# Patient Record
Sex: FEMALE | Race: WHITE | NOT HISPANIC OR LATINO | Employment: OTHER | URBAN - NONMETROPOLITAN AREA
[De-identification: names, ages, dates, MRNs, and addresses within clinical notes are randomized per-mention and may not be internally consistent; named-entity substitution may affect disease eponyms.]

---

## 2017-02-02 ENCOUNTER — OFFICE VISIT (OUTPATIENT)
Dept: OBSTETRICS AND GYNECOLOGY | Facility: CLINIC | Age: 67
End: 2017-02-02

## 2017-02-02 VITALS
DIASTOLIC BLOOD PRESSURE: 74 MMHG | HEIGHT: 68 IN | BODY MASS INDEX: 19.7 KG/M2 | WEIGHT: 130 LBS | SYSTOLIC BLOOD PRESSURE: 110 MMHG

## 2017-02-02 DIAGNOSIS — Z01.419 WELL WOMAN EXAM WITH ROUTINE GYNECOLOGICAL EXAM: Primary | ICD-10-CM

## 2017-02-02 DIAGNOSIS — M81.0 OSTEOPOROSIS: ICD-10-CM

## 2017-02-02 DIAGNOSIS — E78.49 OTHER HYPERLIPIDEMIA: ICD-10-CM

## 2017-02-02 PROCEDURE — 99212 OFFICE O/P EST SF 10 MIN: CPT | Performed by: NURSE PRACTITIONER

## 2017-02-02 PROCEDURE — G0101 CA SCREEN;PELVIC/BREAST EXAM: HCPCS | Performed by: NURSE PRACTITIONER

## 2017-02-02 RX ORDER — BENAZEPRIL HYDROCHLORIDE 10 MG/1
TABLET ORAL
COMMUNITY
Start: 2017-01-31

## 2017-02-02 RX ORDER — AMLODIPINE BESYLATE 2.5 MG/1
TABLET ORAL
COMMUNITY
Start: 2017-01-31

## 2017-02-02 RX ORDER — ALENDRONATE SODIUM 70 MG/1
TABLET ORAL
COMMUNITY
Start: 2017-01-31

## 2017-02-02 RX ORDER — ROSUVASTATIN CALCIUM 5 MG/1
5 TABLET, COATED ORAL DAILY
COMMUNITY
End: 2021-03-11

## 2017-02-02 NOTE — PROGRESS NOTES
"        Chief Complaint   Patient presents with   • Gynecologic Exam     Pt is here today for yearly visit with no c/o            HPI -  Patient is in for her yearly exam.  Patient took HRT   many years years but does not take them any longer.   Patient has no bladder problems.  Lorrie BENITES had breast cancer in her 30's.  There is no colon cancer or ovarian cancer in her family. Patient does not exercise much.   PCP is Dr. Garcia. She  Is treated for bone loss with Fosomax by Dr. Garcia.  Ngoc had a hysterectomy.      The following portions of the patient's history were reviewed and updated as appropriate:vital signs, allergies, current medications, past family history, past medical history, past social history, past surgical history and problem list.    Review of Systems   Constitutional: Negative for activity change, chills and fever.   HENT: Negative for congestion, ear discharge, mouth sores and rhinorrhea.    Eyes: Negative for pain, discharge and redness.   Respiratory: Negative for apnea, choking and shortness of breath.    Cardiovascular: Negative for chest pain and leg swelling.   Gastrointestinal: Negative for abdominal distention, anal bleeding and constipation.   Endocrine: Negative for cold intolerance and polydipsia.   Genitourinary: Negative for difficulty urinating, dysuria, genital sores, urgency and vaginal discharge.   Musculoskeletal: Negative for arthralgias, gait problem and myalgias.   Allergic/Immunologic: Negative for environmental allergies.   Neurological: Negative for dizziness, seizures, syncope and headaches.   Hematological: Negative for adenopathy.   Psychiatric/Behavioral: Negative for agitation, decreased concentration and hallucinations.       Objective        Visit Vitals   • /74 (BP Location: Right arm, Patient Position: Sitting, Cuff Size: Adult)   • Ht 68\" (172.7 cm)   • Wt 130 lb (59 kg)   • Breastfeeding No   • BMI 19.77 kg/m2         Physical Exam   Constitutional: She is " oriented to person, place, and time. She appears well-developed and well-nourished. No distress.   HENT:   Head: Normocephalic and atraumatic.   Eyes: Conjunctivae and EOM are normal. Right eye exhibits no discharge. Left eye exhibits no discharge. No scleral icterus.   Neck: Normal range of motion. Neck supple. No thyromegaly present.   Cardiovascular: Normal rate and regular rhythm.  Exam reveals no friction rub.    No murmur heard.  Pulmonary/Chest: Effort normal and breath sounds normal. No respiratory distress. She has no wheezes. Right breast exhibits no inverted nipple, no mass, no nipple discharge and no skin change. Left breast exhibits no inverted nipple, no mass, no nipple discharge and no skin change.   Abdominal: Soft. She exhibits no distension and no mass. There is no tenderness. There is no guarding. Hernia confirmed negative in the right inguinal area and confirmed negative in the left inguinal area.   Genitourinary: Vagina normal. Rectal exam shows no mass. Pelvic exam was performed with patient supine. There is no rash, tenderness or lesion on the right labia. There is no rash, tenderness or lesion on the left labia. Uterus is not enlarged. Right adnexum displays no mass and no tenderness. Left adnexum displays no mass and no tenderness. No erythema in the vagina. No foreign body in the vagina. No signs of injury around the vagina. No vaginal discharge found.   Genitourinary Comments: B/S/U  Without lesions  Urethra  Normal  Perineum  Normal  Urethral meatus  Normal  Vagina  Normal relaxed anterior wall  Rectum  No masses  Bladder  nontender   Musculoskeletal: She exhibits deformity. She exhibits no edema.   Neurological: She is alert and oriented to person, place, and time. Coordination normal.   Skin: Skin is warm and dry. She is not diaphoretic. No erythema. No pallor.   Psychiatric: She has a normal mood and affect. Her behavior is normal. Thought content normal. Her mood appears not anxious.  Her affect is not blunt, not labile and not inappropriate. She does not exhibit a depressed mood.   Nursing note and vitals reviewed.               Ngoc was seen today for gynecologic exam.    Diagnoses and all orders for this visit:    Well woman exam with routine gynecological exam    Osteoporosis  -     Comprehensive Metabolic Panel  -     Lipid Panel With LDL / HDL Ratio  -     T3, Uptake  -     T4, Free  -     TSH  -     Vitamin D 25 Hydroxy    Other hyperlipidemia  -     Lipid Panel With LDL / HDL Ratio  -     T3, Uptake  -     T4, Free  -     TSH      Mammogram is up to date.  BMD's ordered by Dr. Garcia who prescribes her bone loss.  Patient is counseled re: BSE, diet, exercise, mammogram, calcium and Vit. D3              Ioana Salazar, APRN  2/2/2017

## 2017-02-03 LAB
25(OH)D3+25(OH)D2 SERPL-MCNC: 29.8 NG/ML (ref 30–100)
ALBUMIN SERPL-MCNC: 4.7 G/DL (ref 3.5–5)
ALBUMIN/GLOB SERPL: 1.6 G/DL (ref 1.1–2.5)
ALP SERPL-CCNC: 70 U/L (ref 24–120)
ALT SERPL-CCNC: 64 U/L (ref 0–54)
AST SERPL-CCNC: 33 U/L (ref 7–45)
BILIRUB SERPL-MCNC: 0.7 MG/DL (ref 0.1–1)
BUN SERPL-MCNC: 19 MG/DL (ref 5–21)
BUN/CREAT SERPL: 23.8 (ref 7–25)
CALCIUM SERPL-MCNC: 9.5 MG/DL (ref 8.4–10.4)
CHLORIDE SERPL-SCNC: 101 MMOL/L (ref 98–110)
CHOLEST SERPL-MCNC: 160 MG/DL (ref 130–200)
CO2 SERPL-SCNC: 26 MMOL/L (ref 24–31)
CREAT SERPL-MCNC: 0.8 MG/DL (ref 0.5–1.4)
GLOBULIN SER CALC-MCNC: 2.9 GM/DL
GLUCOSE SERPL-MCNC: 101 MG/DL (ref 70–100)
HDLC SERPL-MCNC: 86 MG/DL
LDLC SERPL CALC-MCNC: 61 MG/DL (ref 0–99)
LDLC/HDLC SERPL: 0.71 {RATIO}
POTASSIUM SERPL-SCNC: 4.3 MMOL/L (ref 3.5–5.3)
PROT SERPL-MCNC: 7.6 G/DL (ref 6.3–8.7)
SODIUM SERPL-SCNC: 140 MMOL/L (ref 135–145)
T3RU NFR SERPL: 27 % (ref 24–39)
T4 FREE SERPL-MCNC: 1.23 NG/DL (ref 0.78–2.19)
TRIGL SERPL-MCNC: 66 MG/DL (ref 0–149)
TSH SERPL DL<=0.005 MIU/L-ACNC: 0.56 MIU/ML (ref 0.47–4.68)
VLDLC SERPL CALC-MCNC: 13.2 MG/DL

## 2017-02-10 DIAGNOSIS — R74.8 ELEVATED LIVER ENZYMES: Primary | ICD-10-CM

## 2017-03-15 ENCOUNTER — RESULTS ENCOUNTER (OUTPATIENT)
Dept: OBSTETRICS AND GYNECOLOGY | Facility: CLINIC | Age: 67
End: 2017-03-15

## 2017-03-15 DIAGNOSIS — R74.8 ELEVATED LIVER ENZYMES: ICD-10-CM

## 2021-01-28 ENCOUNTER — IMMUNIZATION (OUTPATIENT)
Age: 71
End: 2021-01-28
Payer: MEDICARE

## 2021-01-28 PROCEDURE — 0001A PR IMM ADMN SARSCOV2 30MCG/0.3ML DIL RECON 1ST DOSE: CPT | Performed by: FAMILY MEDICINE

## 2021-01-28 PROCEDURE — 91300 COVID-19, PFIZER VACCINE 30MCG/0.3ML DOSE: CPT | Performed by: FAMILY MEDICINE

## 2021-03-11 ENCOUNTER — OFFICE VISIT (OUTPATIENT)
Dept: GASTROENTEROLOGY | Facility: CLINIC | Age: 71
End: 2021-03-11

## 2021-03-11 VITALS
BODY MASS INDEX: 20.31 KG/M2 | TEMPERATURE: 97.1 F | SYSTOLIC BLOOD PRESSURE: 120 MMHG | HEART RATE: 84 BPM | WEIGHT: 134 LBS | OXYGEN SATURATION: 99 % | DIASTOLIC BLOOD PRESSURE: 80 MMHG | HEIGHT: 68 IN

## 2021-03-11 DIAGNOSIS — I10 HTN (HYPERTENSION), BENIGN: ICD-10-CM

## 2021-03-11 DIAGNOSIS — Z12.11 ENCOUNTER FOR SCREENING FOR MALIGNANT NEOPLASM OF COLON: Primary | ICD-10-CM

## 2021-03-11 DIAGNOSIS — R10.13 DYSPEPSIA: ICD-10-CM

## 2021-03-11 PROCEDURE — 99203 OFFICE O/P NEW LOW 30 MIN: CPT | Performed by: CLINICAL NURSE SPECIALIST

## 2021-03-11 RX ORDER — SODIUM, POTASSIUM,MAG SULFATES 17.5-3.13G
SOLUTION, RECONSTITUTED, ORAL ORAL
Qty: 177 ML | Refills: 0 | Status: ON HOLD | OUTPATIENT
Start: 2021-03-11 | End: 2021-04-19

## 2021-03-11 NOTE — PROGRESS NOTES
Ngoc Phillip  1950      3/11/2021  Chief Complaint   Patient presents with   • GI Problem     Discuss Hiatal Hernia and schedule a colonoscopy     Subjective   HPI  Ngoc Phillip is a 70 y.o. female who presents as a referral for preventative maintenance. She has no complaints of nausea or vomiting. No change in bowels. No wt loss. No BRBPR. No melena. There is NO family hx for colon cancer. No abdominal pain.  She has a hx of reflux and indigestion and at one time took PPI therapy in the past but stopped for unknown reason. She does have some mild to moderate reflux symptoms with gas and belching. She says it is mild to moderate for her. No certain triggers. No dysphagia. No wt loss.   Past Medical History:   Diagnosis Date   • Hyperlipidemia    • Hypertension    • Osteoporosis      Past Surgical History:   Procedure Laterality Date   • APPENDECTOMY     • CHOLECYSTECTOMY     • COLONOSCOPY  03/10/2016    Tics otherwise normal exam repeat in 5 years   • DILATATION AND CURETTAGE     • ENDOSCOPY  03/22/2016    HH otherwise normal exam   • HERNIA REPAIR     • SUBTOTAL HYSTERECTOMY       Outpatient Medications Marked as Taking for the 3/11/21 encounter (Office Visit) with Sara Calabrese APRN   Medication Sig Dispense Refill   • alendronate (FOSAMAX) 70 MG tablet      • amLODIPine (NORVASC) 2.5 MG tablet      • benazepril (LOTENSIN) 10 MG tablet      • Calcium Carb-Cholecalciferol (CALTRATE 600+D) 600-800 MG-UNIT tablet Take 1 tablet by mouth 2 (Two) Times a Day With Meals.     • Multiple Vitamins-Minerals (EYE VITAMINS PO) Take  by mouth.     • Multiple Vitamins-Minerals (MULTIVITAMIN ADULTS 50+ PO) Take  by mouth.       Allergies   Allergen Reactions   • Codeine Nausea And Vomiting     Social History     Socioeconomic History   • Marital status:      Spouse name: Not on file   • Number of children: Not on file   • Years of education: Not on file   • Highest education level: Not on file   Tobacco  "Use   • Smoking status: Never Smoker   • Smokeless tobacco: Never Used   Substance and Sexual Activity   • Alcohol use: No   • Drug use: No   • Sexual activity: Yes     Partners: Male     Birth control/protection: Surgical     Family History   Problem Relation Age of Onset   • Myasthenia gravis Mother    • Esophageal cancer Father    • Heart disease Brother    • Breast cancer Maternal Grandmother    • No Known Problems Maternal Grandfather    • No Known Problems Paternal Grandmother    • No Known Problems Paternal Grandfather    • No Known Problems Brother    • Breast cancer Paternal Aunt 70   • Colon cancer Neg Hx    • Colon polyps Neg Hx      Health Maintenance   Topic Date Due   • DXA SCAN  Never done   • TDAP/TD VACCINES (1 - Tdap) Never done   • ZOSTER VACCINE (1 of 2) Never done   • Pneumococcal Vaccine 65+ (1 of 1 - PPSV23) Never done   • HEPATITIS C SCREENING  Never done   • LIPID PANEL  02/02/2018   • ANNUAL WELLNESS VISIT  10/16/2019   • MAMMOGRAM  12/04/2019   • COVID-19 Vaccine (2 of 2 - Pfizer series) 02/18/2021   • COLONOSCOPY  03/10/2026   • INFLUENZA VACCINE  Completed   • MENINGOCOCCAL VACCINE  Aged Out       REVIEW OF SYSTEMS  General: well appearing, no fever chills or sweats, no unexplained wt loss  HEENT: no acute visual or hearing disturbances  Cardiovascular: No chest pain or palpitations  Pulmonary: No shortness of breath, coughing, wheezing or hemoptysis  : No burning, urgency, hematuria, or dysuria  Musculoskeletal: No joint pain or stiffness  Peripheral: no edema  Skin: No lesions or rashes  Neuro: No dizziness, headaches, stroke, syncope  Endocrine: No hot or cold intolerances  Hematological: No blood dyscrasias    Objective   Vitals:    03/11/21 0951   BP: 120/80   Pulse: 84   Temp: 97.1 °F (36.2 °C)   SpO2: 99%   Weight: 60.8 kg (134 lb)   Height: 172.7 cm (68\")     Body mass index is 20.37 kg/m².  Patient's Body mass index is 20.37 kg/m². BMI is within normal parameters. No follow-up " required..      PHYSICAL EXAM  General: age appropriate well nourished well appearing, no acute distress  Head: normocephalic and atraumatic  Global assessment-supple  Neck-No JVD noted, no lymphadenopathy  Pulmonary-clear to auscultation bilaterally, normal respiratory effort  Cardiovascular-normal rate and rhythm, normal heart sounds, S1 and S2 noted  Abdomen-soft, non tender, non distended, normal bowel sounds all 4 quadrants, no hepatosplenomegaly noted  Extremities-No clubbing cyanosis or edema  Neuro-Non focal, converses appropriately, awake, alert, oriented    Assessment/Plan     Diagnoses and all orders for this visit:    1. Encounter for screening for malignant neoplasm of colon (Primary)  -     Case Request; Standing  -     Follow Anesthesia Guidelines / Standing Orders; Future  -     Obtain Informed Consent; Future  -     Implement Anesthesia Orders Day of Procedure; Standing  -     Obtain Informed Consent; Standing  -     Verify Bowel Prep Was Successful; Standing  -     Case Request    2. HTN (hypertension), benign  Comments:  cont BP medication the day of procedure    3. Dyspepsia    Other orders  -     Suprep Bowel Prep Kit 17.5-3.13-1.6 GM/177ML solution oral solution; Take as directed by office instructions provided  Dispense: 177 mL; Refill: 0        ESOPHAGOGASTRODUODENOSCOPY WITH ANESTHESIA (N/A), COLONOSCOPY WITH ANESTHESIA (N/A)  Body mass index is 20.37 kg/m².    Patient instructions on prep prior to procedure provided to the patient.    All risks, benefits, alternatives, and indications of colonoscopy procedure have been discussed with the patient. Risks to include perforation of the colon requiring possible surgery or colostomy, risk of bleeding from biopsies or removal of colon tissue, possibility of missing a colon polyp or cancer, or adverse drug reaction.  Benefits to include the diagnosis and management of disease of the colon and rectum. Alternatives to include barium enema,  radiographic evaluation, lab testing or no intervention. Pt verbalizes understanding and agrees.     Sara Diane Calabrese, CLARKE  3/11/2021  10:04 CST      IF YOU SMOKE OR USE TOBACCO PLEASE READ THE FOLLOWIN minutes reading provided    Why is smoking bad for me?  Smoking increases the risk of heart disease, lung disease, vascular disease, stroke, and cancer.     If you smoke, STOP!    If you would like more information on quitting smoking, please visit the [employer] website: [email or web address]   This link will provide additional resources including the QUIT line and the Beat the Pack support groups.     For more information:    Quit Now [place]  -QUIT-NOW  [email or web address]    Obesity, Adult  Obesity is the condition of having too much total body fat. Being overweight or obese means that your weight is greater than what is considered healthy for your body size. Obesity is determined by a measurement called BMI. BMI is an estimate of body fat and is calculated from height and weight. For adults, a BMI of 30 or higher is considered obese.  Obesity can eventually lead to other health concerns and major illnesses, including:  · Stroke.  · Coronary artery disease (CAD).  · Type 2 diabetes.  · Some types of cancer, including cancers of the colon, breast, uterus, and gallbladder.  · Osteoarthritis.  · High blood pressure (hypertension).  · High cholesterol.  · Sleep apnea.  · Gallbladder stones.  · Infertility problems.  What are the causes?  The main cause of obesity is taking in (consuming) more calories than your body uses for energy. Other factors that contribute to this condition may include:  · Being born with genes that make you more likely to become obese.  · Having a medical condition that causes obesity. These conditions include:  ¨ Hypothyroidism.  ¨ Polycystic ovarian syndrome (PCOS).  ¨ Binge-eating disorder.  ¨ Cushing  syndrome.  · Taking certain medicines, such as steroids, antidepressants, and seizure medicines.  · Not being physically active (sedentary lifestyle).  · Living where there are limited places to exercise safely or buy healthy foods.  · Not getting enough sleep.  What increases the risk?  The following factors may increase your risk of this condition:  · Having a family history of obesity.  · Being a woman of -American descent.  · Being a man of  descent.  What are the signs or symptoms?  Having excessive body fat is the main symptom of this condition.  How is this diagnosed?  This condition may be diagnosed based on:  · Your symptoms.  · Your medical history.  · A physical exam. Your health care provider may measure:  ¨ Your BMI. If you are an adult with a BMI between 25 and less than 30, you are considered overweight. If you are an adult with a BMI of 30 or higher, you are considered obese.  ¨ The distances around your hips and your waist (circumferences). These may be compared to each other to help diagnose your condition.  ¨ Your skinfold thickness. Your health care provider may gently pinch a fold of your skin and measure it.  How is this treated?  Treatment for this condition often includes changing your lifestyle. Treatment may include some or all of the following:  · Dietary changes. Work with your health care provider and a dietitian to set a weight-loss goal that is healthy and reasonable for you. Dietary changes may include eating:  ¨ Smaller portions. A portion size is the amount of a particular food that is healthy for you to eat at one time. This varies from person to person.  ¨ Low-calorie or low-fat options.  ¨ More whole grains, fruits, and vegetables.  · Regular physical activity. This may include aerobic activity (cardio) and strength training.  · Medicine to help you lose weight. Your health care provider may prescribe medicine if you are unable to lose 1 pound a week after 6 weeks  of eating more healthily and doing more physical activity.  · Surgery. Surgical options may include gastric banding and gastric bypass. Surgery may be done if:  ¨ Other treatments have not helped to improve your condition.  ¨ You have a BMI of 40 or higher.  ¨ You have life-threatening health problems related to obesity.  Follow these instructions at home:     Eating and drinking     · Follow recommendations from your health care provider about what you eat and drink. Your health care provider may advise you to:  ¨ Limit fast foods, sweets, and processed snack foods.  ¨ Choose low-fat options, such as low-fat milk instead of whole milk.  ¨ Eat 5 or more servings of fruits or vegetables every day.  ¨ Eat at home more often. This gives you more control over what you eat.  ¨ Choose healthy foods when you eat out.  ¨ Learn what a healthy portion size is.  ¨ Keep low-fat snacks on hand.  ¨ Avoid sugary drinks, such as soda, fruit juice, iced tea sweetened with sugar, and flavored milk.  ¨ Eat a healthy breakfast.  · Drink enough water to keep your urine clear or pale yellow.  · Do not go without eating for long periods of time (do not fast) or follow a fad diet. Fasting and fad diets can be unhealthy and even dangerous.  Physical Activity   · Exercise regularly, as told by your health care provider. Ask your health care provider what types of exercise are safe for you and how often you should exercise.  · Warm up and stretch before being active.  · Cool down and stretch after being active.  · Rest between periods of activity.  Lifestyle   · Limit the time that you spend in front of your TV, computer, or video game system.  · Find ways to reward yourself that do not involve food.  · Limit alcohol intake to no more than 1 drink a day for nonpregnant women and 2 drinks a day for men. One drink equals 12 oz of beer, 5 oz of wine, or 1½ oz of hard liquor.  General instructions   · Keep a weight loss journal to keep track of  the food you eat and how much you exercise you get.  · Take over-the-counter and prescription medicines only as told by your health care provider.  · Take vitamins and supplements only as told by your health care provider.  · Consider joining a support group. Your health care provider may be able to recommend a support group.  · Keep all follow-up visits as told by your health care provider. This is important.  Contact a health care provider if:  · You are unable to meet your weight loss goal after 6 weeks of dietary and lifestyle changes.  This information is not intended to replace advice given to you by your health care provider. Make sure you discuss any questions you have with your health care provider.  Document Released: 01/25/2006 Document Revised: 05/22/2017 Document Reviewed: 10/05/2016  Elsevier Interactive Patient Education © 2017 [employer]

## 2021-03-12 PROBLEM — Z12.11 ENCOUNTER FOR SCREENING FOR MALIGNANT NEOPLASM OF COLON: Status: ACTIVE | Noted: 2021-03-12

## 2021-04-12 ENCOUNTER — TRANSCRIBE ORDERS (OUTPATIENT)
Dept: GASTROENTEROLOGY | Facility: CLINIC | Age: 71
End: 2021-04-12

## 2021-04-12 DIAGNOSIS — Z01.818 PREOPERATIVE TESTING: Primary | ICD-10-CM

## 2021-04-16 ENCOUNTER — LAB (OUTPATIENT)
Dept: LAB | Facility: HOSPITAL | Age: 71
End: 2021-04-16

## 2021-04-16 LAB — SARS-COV-2 ORF1AB RESP QL NAA+PROBE: NOT DETECTED

## 2021-04-16 PROCEDURE — C9803 HOPD COVID-19 SPEC COLLECT: HCPCS | Performed by: INTERNAL MEDICINE

## 2021-04-16 PROCEDURE — U0004 COV-19 TEST NON-CDC HGH THRU: HCPCS | Performed by: INTERNAL MEDICINE

## 2021-04-16 PROCEDURE — U0005 INFEC AGEN DETEC AMPLI PROBE: HCPCS | Performed by: INTERNAL MEDICINE

## 2021-04-19 ENCOUNTER — ANESTHESIA EVENT (OUTPATIENT)
Dept: GASTROENTEROLOGY | Facility: HOSPITAL | Age: 71
End: 2021-04-19

## 2021-04-19 ENCOUNTER — HOSPITAL ENCOUNTER (OUTPATIENT)
Facility: HOSPITAL | Age: 71
Setting detail: HOSPITAL OUTPATIENT SURGERY
Discharge: HOME | End: 2021-04-19
Attending: INTERNAL MEDICINE | Admitting: INTERNAL MEDICINE

## 2021-04-19 ENCOUNTER — ANESTHESIA (OUTPATIENT)
Dept: GASTROENTEROLOGY | Facility: HOSPITAL | Age: 71
End: 2021-04-19

## 2021-04-19 VITALS
OXYGEN SATURATION: 97 % | BODY MASS INDEX: 20 KG/M2 | DIASTOLIC BLOOD PRESSURE: 80 MMHG | TEMPERATURE: 98.4 F | HEART RATE: 95 BPM | RESPIRATION RATE: 23 BRPM | HEIGHT: 68 IN | WEIGHT: 132 LBS | SYSTOLIC BLOOD PRESSURE: 115 MMHG

## 2021-04-19 DIAGNOSIS — Z12.11 ENCOUNTER FOR SCREENING FOR MALIGNANT NEOPLASM OF COLON: ICD-10-CM

## 2021-04-19 PROCEDURE — 25010000002 PROPOFOL 10 MG/ML EMULSION: Performed by: NURSE ANESTHETIST, CERTIFIED REGISTERED

## 2021-04-19 PROCEDURE — 45385 COLONOSCOPY W/LESION REMOVAL: CPT | Performed by: INTERNAL MEDICINE

## 2021-04-19 PROCEDURE — 43235 EGD DIAGNOSTIC BRUSH WASH: CPT | Performed by: INTERNAL MEDICINE

## 2021-04-19 PROCEDURE — 88305 TISSUE EXAM BY PATHOLOGIST: CPT | Performed by: INTERNAL MEDICINE

## 2021-04-19 RX ORDER — LIDOCAINE HYDROCHLORIDE 20 MG/ML
INJECTION, SOLUTION EPIDURAL; INFILTRATION; INTRACAUDAL; PERINEURAL AS NEEDED
Status: DISCONTINUED | OUTPATIENT
Start: 2021-04-19 | End: 2021-04-19 | Stop reason: SURG

## 2021-04-19 RX ORDER — SODIUM CHLORIDE 9 MG/ML
500 INJECTION, SOLUTION INTRAVENOUS CONTINUOUS PRN
Status: DISCONTINUED | OUTPATIENT
Start: 2021-04-19 | End: 2021-04-19 | Stop reason: HOSPADM

## 2021-04-19 RX ORDER — PROPOFOL 10 MG/ML
VIAL (ML) INTRAVENOUS AS NEEDED
Status: DISCONTINUED | OUTPATIENT
Start: 2021-04-19 | End: 2021-04-19 | Stop reason: SURG

## 2021-04-19 RX ORDER — FAMOTIDINE 20 MG/1
20 TABLET, FILM COATED ORAL 2 TIMES DAILY
Qty: 180 TABLET | Refills: 3 | Status: SHIPPED | OUTPATIENT
Start: 2021-04-19 | End: 2021-07-18

## 2021-04-19 RX ORDER — SODIUM CHLORIDE 0.9 % (FLUSH) 0.9 %
10 SYRINGE (ML) INJECTION AS NEEDED
Status: DISCONTINUED | OUTPATIENT
Start: 2021-04-19 | End: 2021-04-19 | Stop reason: HOSPADM

## 2021-04-19 RX ADMIN — LIDOCAINE HYDROCHLORIDE 100 MG: 20 INJECTION, SOLUTION EPIDURAL; INFILTRATION; INTRACAUDAL; PERINEURAL at 08:44

## 2021-04-19 RX ADMIN — SODIUM CHLORIDE 500 ML: 9 INJECTION, SOLUTION INTRAVENOUS at 07:39

## 2021-04-19 RX ADMIN — GLYCOPYRROLATE 0.2 MG: 0.2 INJECTION, SOLUTION INTRAMUSCULAR; INTRAVENOUS at 08:44

## 2021-04-19 RX ADMIN — PROPOFOL 330 MG: 10 INJECTION, EMULSION INTRAVENOUS at 08:44

## 2021-04-19 NOTE — ANESTHESIA PREPROCEDURE EVALUATION
Anesthesia Evaluation     Patient summary reviewed   no history of anesthetic complications:  NPO Solid Status: > 8 hours  NPO Liquid Status: > 4 hours           Airway   Mallampati: I  TM distance: >3 FB  Neck ROM: full  No difficulty expected  Dental      Pulmonary    (-) asthma, sleep apnea, not a smoker  Cardiovascular   Exercise tolerance: good (4-7 METS)    (+) hypertension,   (-) past MI, CAD, dysrhythmias, cardiac stents      Neuro/Psych  (-) seizures, TIA, CVA  GI/Hepatic/Renal/Endo    (-) liver disease, no renal disease, diabetes    Musculoskeletal     Abdominal    Substance History      OB/GYN          Other                        Anesthesia Plan    ASA 2     MAC     intravenous induction     Anesthetic plan, all risks, benefits, and alternatives have been provided, discussed and informed consent has been obtained with: patient.

## 2021-04-19 NOTE — H&P
[facility]  Pre Procedure History & Physical    Chief Complaint:   Epigastric pain, screening    Subjective     HPI:   Here for endoscopy and colonoscopy.  Have some midepigastric pain.  Also screening for colon polyps.    Past Medical History:   Past Medical History:   Diagnosis Date   • Hyperlipidemia    • Hypertension    • Osteoporosis        Past Surgical History:  Past Surgical History:   Procedure Laterality Date   • APPENDECTOMY     • CHOLECYSTECTOMY     • COLONOSCOPY  03/10/2016    Tics otherwise normal exam repeat in 5 years   • DILATATION AND CURETTAGE     • ENDOSCOPY  03/22/2016    HH otherwise normal exam   • HERNIA REPAIR     • SUBTOTAL HYSTERECTOMY         Family History:  Family History   Problem Relation Age of Onset   • Myasthenia gravis Mother    • Esophageal cancer Father    • Heart disease Brother    • Breast cancer Maternal Grandmother    • No Known Problems Maternal Grandfather    • No Known Problems Paternal Grandmother    • No Known Problems Paternal Grandfather    • No Known Problems Brother    • Breast cancer Paternal Aunt 70   • Colon cancer Neg Hx    • Colon polyps Neg Hx        Social History:   reports that she has never smoked. She has never used smokeless tobacco. She reports that she does not drink alcohol and does not use drugs.    Medications:   Prior to Admission medications    Medication Sig Start Date End Date Taking? Authorizing Provider   alendronate (FOSAMAX) 70 MG tablet  1/31/17  Yes Dee Bowman MD   amLODIPine (NORVASC) 2.5 MG tablet  1/31/17  Yes Dee Bowman MD   benazepril (LOTENSIN) 10 MG tablet  1/31/17  Yes Dee Bowman MD   Calcium Carb-Cholecalciferol (CALTRATE 600+D) 600-800 MG-UNIT tablet Take 1 tablet by mouth 2 (Two) Times a Day With Meals.   Yes Dee Bowman MD   Multiple Vitamins-Minerals (EYE VITAMINS PO) Take  by mouth.   Yes Dee Bowman MD   Multiple Vitamins-Minerals (MULTIVITAMIN ADULTS  "50+ PO) Take  by mouth.   Yes Provider, MD Dee   Suprep Bowel Prep Kit 17.5-3.13-1.6 GM/177ML solution oral solution Take as directed by office instructions provided 3/11/21 4/19/21  Sara Calabrese APRN       Allergies:  Codeine    Objective     Blood pressure 146/89, pulse 74, temperature 98.4 °F (36.9 °C), temperature source Temporal, resp. rate 18, height 172.7 cm (68\"), weight 59.9 kg (132 lb), SpO2 96 %, not currently breastfeeding.    Physical Exam   Constitutional: Pt is oriented to person, place, and in no distress.   HENT: Mouth/Throat: Oropharynx is clear.   Cardiovascular: Normal rate, regular rhythm.    Pulmonary/Chest: Effort normal. No respiratory distress. No  wheezes.   Abdominal: Soft. Non-distended.  Skin: Skin is warm and dry.   Psychiatric: Mood, memory, affect and judgment appear normal.     Assessment/Plan     Diagnosis:  Reflux epigastric pain polyps    Anticipated Surgical Procedure:    Proceed with endoscopy and colonoscopy as scheduled    The following major R/B/A were discussed with the patient, however the list is not all inclusive . Risk:  Bleeding (immediate and delayed), perforation (rupture or tear), reaction to medication, missed lesion/cancer, pain during the procedure, infection, need for surgery, need for ostomy, need for mechanical ventilation (breathing machine), death.  Benefits: removal of polyp/tissue, burn/clip/or inject to stop bleeding, removal of foreign body, dilate any stricture.  Alternatives: Xray or CT, surgery, do nothing with associated risk   The patient was given time to ask question and received explanation, and agrees to proceed as per History and Physical.   No guarantee given or expressed.    EMR Dragon/transcription disclaimer: Much of this encounter note is an electronic transcription/translation of spoken language to printed text.  The electronic translation of spoken language may permit erroneous, or at times, nonsensical words or phrases " to be inadvertently transcribed.  Although I have reviewed the note for such errors, some may still exist.    Charlie Gillespie MD  08:43 CDT  4/19/2021

## 2021-04-19 NOTE — ANESTHESIA POSTPROCEDURE EVALUATION
"Patient: Ngoc Phillip    Procedure Summary     Date: 04/19/21 Room / Location:  [facility] /  [facility]    Anesthesia Start: 0840 Anesthesia Stop: 0907    Procedures:       ESOPHAGOGASTRODUODENOSCOPY WITH ANESTHESIA (N/A )      COLONOSCOPY WITH ANESTHESIA (N/A ) Diagnosis:       Encounter for screening for malignant neoplasm of colon      (Encounter for screening for malignant neoplasm of colon [Z12.11])    Surgeons: Charlie Gillespie MD Provider: Mira Benton CRNA    Anesthesia Type: MAC ASA Status: 2          Anesthesia Type: MAC    Vitals  Vitals Value Taken Time   BP 87/64 04/19/21 0908   Temp     Pulse 89 04/19/21 0910   Resp 15 04/19/21 0908   SpO2 97 % 04/19/21 0910   Vitals shown include unvalidated device data.        Post Anesthesia Care and Evaluation    Patient location during evaluation: PACU  Patient participation: complete - patient participated  Level of consciousness: awake and alert  Pain management: adequate  Airway patency: patent  Anesthetic complications: No anesthetic complications    Cardiovascular status: acceptable  Respiratory status: acceptable  Hydration status: acceptable    Comments: Blood pressure (!) 87/64, pulse 91, temperature 98.4 °F (36.9 °C), temperature source Temporal, resp. rate 15, height 172.7 cm (68\"), weight 59.9 kg (132 lb), SpO2 97 %, not currently breastfeeding.    Pt discharged from PACU based on arabella score >8      "

## 2021-04-21 LAB
CYTO UR: NORMAL
LAB AP CASE REPORT: NORMAL
LAB AP CLINICAL INFORMATION: NORMAL
LAB AP DIAGNOSIS COMMENT: NORMAL
PATH REPORT.FINAL DX SPEC: NORMAL
PATH REPORT.GROSS SPEC: NORMAL

## 2023-12-13 ENCOUNTER — OFFICE VISIT (OUTPATIENT)
Dept: PULMONOLOGY | Facility: CLINIC | Age: 73
End: 2023-12-13
Payer: MEDICARE

## 2023-12-13 VITALS
HEIGHT: 68 IN | DIASTOLIC BLOOD PRESSURE: 62 MMHG | BODY MASS INDEX: 18.79 KG/M2 | WEIGHT: 124 LBS | HEART RATE: 106 BPM | OXYGEN SATURATION: 97 % | SYSTOLIC BLOOD PRESSURE: 122 MMHG

## 2023-12-13 DIAGNOSIS — Z87.891 HISTORY OF CIGARETTE SMOKING: ICD-10-CM

## 2023-12-13 DIAGNOSIS — R91.1 NODULE OF UPPER LOBE OF RIGHT LUNG: Primary | ICD-10-CM

## 2023-12-13 PROCEDURE — 99204 OFFICE O/P NEW MOD 45 MIN: CPT | Performed by: INTERNAL MEDICINE

## 2023-12-13 NOTE — PROGRESS NOTES
Background:  Pt w rul nodule x3 identified 11/2023, up to 7 mm  Chief Complaint  abnormal findings on diagnositc imaging    Subjective    History of Present Illness    Ngoc Phillip presents to [facility]ARE [facility]IC[facility].  History of Present Illness  She quit smoking 28 years ago after smoking 1ppd for 27 years.  She had Covid and and then another virus after that. She has been found to have asymptomatic nodules in GINA and we are asked to see her. No prior history of cancer or tb or other lung disease.  Lives in [place], no exotic travel.  No fevers or dyspnea       has a past medical history of Hyperlipidemia, Hypertension, and Osteoporosis.   has a past surgical history that includes Dilation and curettage of uterus; Appendectomy; Subtotal Hysterectomy; Cholecystectomy; Hernia repair; Esophagogastroduodenoscopy (03/22/2016); Colonoscopy (03/10/2016); Esophagogastroduodenoscopy (N/A, 4/19/2021); and Colonoscopy (N/A, 4/19/2021).  family history includes Breast cancer in her maternal grandmother; Breast cancer (age of onset: 70) in her paternal aunt; Esophageal cancer in her father; Heart disease in her brother; Myasthenia gravis in her mother; No Known Problems in her brother, maternal grandfather, paternal grandfather, and paternal grandmother.   reports that she quit smoking about 28 years ago. Her smoking use included cigarettes. She has a 27.00 pack-year smoking history. She has never used smokeless tobacco. She reports that she does not drink alcohol and does not use drugs.  Allergies   Allergen Reactions    Codeine Nausea And Vomiting     Current Outpatient Medications   Medication Instructions    alendronate (FOSAMAX) 70 mg, weekly    amLODIPine (NORVASC) 2.5 MG tablet No dose, route, or frequency recorded.    benazepril (LOTENSIN) 10 MG tablet No dose, route, or frequency recorded.    Calcium Carb-Cholecalciferol (CALTRATE 600+D) 600-800 MG-UNIT tablet 1 tablet,  "Oral, 2 Times Daily With Meals    Multiple Vitamins-Minerals (EYE VITAMINS PO) Oral    Multiple Vitamins-Minerals (MULTIVITAMIN ADULTS 50+ PO) Oral      Objective     Vital Signs:   /62   Pulse 106   Ht 172.7 cm (68\")   Wt 56.2 kg (124 lb)   SpO2 97% Comment: BERONICA  BMI 18.85 kg/m²   Physical Exam  Constitutional:       General: She is not in acute distress.     Appearance: She is well-developed. She is not ill-appearing or toxic-appearing.   HENT:      Head: Atraumatic.   Eyes:      General: No scleral icterus.     Conjunctiva/sclera: Conjunctivae normal.   Cardiovascular:      Rate and Rhythm: Normal rate and regular rhythm.      Heart sounds: S1 normal and S2 normal.   Pulmonary:      Effort: Pulmonary effort is normal.      Breath sounds: Normal breath sounds.   Abdominal:      General: There is no distension.   Musculoskeletal:         General: No deformity.      Cervical back: Neck supple.   Skin:     Coloration: Skin is not pale.      Findings: No rash.   Neurological:      Mental Status: She is alert.        Result Review  Data Reviewed:        Personal review of imaging : CT scan shows small subcentimeter nodules most prominent one in GINA.  indeterminate               Assessment and Plan   Diagnoses and all orders for this visit:    1. Nodule of upper lobe of right lung (Primary)  -     CT Chest Without Contrast Diagnostic; Future    2. History of cigarette smoking  Comments:  quit around 1995    She has a remote history of smoking.  She has multiple irregular nodules, largest 7 mm.  Bronchiectasis was described in 1 segment but this is asymptomatic.  Will plan follow up ct at 3 months, in 2/2024 and follow up after that  Will consider nodify if worrisome findings present in follow up; pet scan if 7 mm nodule enlarges such that PET could provide meaningful information    Follow Up   Return in about 2 months (around 2/13/2024).  Patient was given instructions and counseling regarding her condition or " for health maintenance advice. Please see specific information pulled into the AVS if appropriate.    Electronically signed by Raf Bridges MD, 12/13/2023, 11:07 CST

## 2023-12-21 ENCOUNTER — PATIENT ROUNDING (BHMG ONLY) (OUTPATIENT)
Dept: PULMONOLOGY | Facility: CLINIC | Age: 73
End: 2023-12-21
Payer: MEDICARE

## 2023-12-21 NOTE — PROGRESS NOTES
December 21, 2023    Hello, may I speak with Ngoc Phillip?    My name is Ileana Arreguin    I am  with [facility]  [address]  formerly [facility] [id number] [phone].    Before we get started may I verify your date of birth? 1950    I am calling to officially welcome you to our practice and ask about your recent visit. Is this a good time to talk? LVM    Tell me about your visit with us. What things went well?         We're always looking for ways to make our patients' experiences even better. Do you have recommendations on ways we may improve?      Overall were you satisfied with your first visit to our practice?        I appreciate you taking the time to speak with me today. Is there anything else I can do for you?       Thank you, and have a great day.

## 2024-01-03 ENCOUNTER — TRANSCRIBE ORDERS (OUTPATIENT)
Dept: ADMINISTRATIVE | Facility: HOSPITAL | Age: 74
End: 2024-01-03
Payer: MEDICARE

## 2024-01-03 DIAGNOSIS — Z12.31 BREAST CANCER SCREENING BY MAMMOGRAM: Primary | ICD-10-CM

## 2024-02-08 ENCOUNTER — HOSPITAL ENCOUNTER (OUTPATIENT)
Dept: CT IMAGING | Facility: HOSPITAL | Age: 74
Discharge: HOME | End: 2024-02-08
Admitting: INTERNAL MEDICINE
Payer: MEDICARE

## 2024-02-08 DIAGNOSIS — R91.1 NODULE OF UPPER LOBE OF RIGHT LUNG: ICD-10-CM

## 2024-02-08 PROCEDURE — 71250 CT THORAX DX C-: CPT

## 2024-02-08 NOTE — PROGRESS NOTES
Please call the patient regarding her abnormal result.  Still shows the nodules from before and there is new one that is probably a focus of inflammation; will discuss further at office visit, nothing to do right now.  We will need to continue close follow up

## 2024-02-14 ENCOUNTER — OFFICE VISIT (OUTPATIENT)
Dept: PULMONOLOGY | Facility: CLINIC | Age: 74
End: 2024-02-14
Payer: MEDICARE

## 2024-02-14 VITALS
HEART RATE: 78 BPM | BODY MASS INDEX: 18.79 KG/M2 | DIASTOLIC BLOOD PRESSURE: 74 MMHG | WEIGHT: 124 LBS | SYSTOLIC BLOOD PRESSURE: 122 MMHG | HEIGHT: 68 IN | OXYGEN SATURATION: 95 %

## 2024-02-14 DIAGNOSIS — R91.1 NODULE OF MIDDLE LOBE OF RIGHT LUNG: ICD-10-CM

## 2024-02-14 DIAGNOSIS — R91.1 NODULE OF UPPER LOBE OF RIGHT LUNG: Primary | ICD-10-CM

## 2024-02-14 PROCEDURE — 99214 OFFICE O/P EST MOD 30 MIN: CPT | Performed by: INTERNAL MEDICINE

## 2024-03-14 ENCOUNTER — TRANSCRIBE ORDERS (OUTPATIENT)
Dept: ADMINISTRATIVE | Facility: HOSPITAL | Age: 74
End: 2024-03-14
Payer: MEDICARE

## 2024-03-14 DIAGNOSIS — R73.03 PREDIABETES: Primary | ICD-10-CM

## 2024-05-09 ENCOUNTER — HOSPITAL ENCOUNTER (OUTPATIENT)
Dept: CT IMAGING | Facility: HOSPITAL | Age: 74
Discharge: HOME | End: 2024-05-09
Admitting: INTERNAL MEDICINE
Payer: MEDICARE

## 2024-05-09 DIAGNOSIS — R91.1 NODULE OF UPPER LOBE OF RIGHT LUNG: ICD-10-CM

## 2024-05-09 PROCEDURE — 71250 CT THORAX DX C-: CPT

## 2024-05-14 NOTE — PROGRESS NOTES
Chief Complaint  Nodule of upper lobe of right lung    Subjective    History of Present Illness {CC  Problem List  Visit Diagnosis   Encounters  Notes  Medications  Labs  Result Review Imaging  Media     Ngoc Phillip presents to [facility] for:    History of Present Illness  Ms. Phillip is here for follow-up and management of lung nodules and bronchiectasis.  She had a recent CT chest she is here to review.  She tells me she has a cough but it is mostly nonproductive.  She does notice some yellow to green sputum occasionally.  She denies fever, and night sweats.  She has had some issues with fatigue lately but lost 20 pounds in 5 weeks and is contributing the fatigue to this.  She does notice congestion at times and mostly in the morning.  She is wanting some techniques to keep mucus cleared out.       Prior to Admission medications    Medication Sig Start Date End Date Taking? Authorizing Provider   alendronate (FOSAMAX) 70 MG tablet 1 tablet. weekly 17   Dee Bowman MD   amLODIPine (NORVASC) 2.5 MG tablet  17   Dee Bowman MD   benazepril (LOTENSIN) 10 MG tablet  17   Dee Bowman MD   Calcium Carb-Cholecalciferol (CALTRATE 600+D) 600-800 MG-UNIT tablet Take 1 tablet by mouth 2 (Two) Times a Day With Meals.    Dee Bowman MD   Multiple Vitamins-Minerals (EYE VITAMINS PO) Take  by mouth.    Dee Bowman MD   Multiple Vitamins-Minerals (MULTIVITAMIN ADULTS 50+ PO) Take  by mouth.    Dee Bowman MD       Social History     Socioeconomic History    Marital status:    Tobacco Use    Smoking status: Former     Current packs/day: 0.00     Average packs/day: 1 pack/day for 27.0 years (27.0 ttl pk-yrs)     Types: Cigarettes     Start date:      Quit date:      Years since quittin.3    Smokeless tobacco: Never   Vaping Use    Vaping status: Never Used   Substance and  "Sexual Activity    Alcohol use: No    Drug use: No    Sexual activity: Yes     Partners: Male     Birth control/protection: Surgical       Objective   Vital Signs:   /80   Pulse 67   Ht 172.7 cm (68\")   Wt 57.6 kg (127 lb)   SpO2 98% Comment: RA  BMI 19.31 kg/m²     Physical Exam  Constitutional:       General: She is not in acute distress.  HENT:      Head: Normocephalic.      Nose: Nose normal.      Mouth/Throat:      Mouth: Mucous membranes are moist.   Eyes:      General: No scleral icterus.  Cardiovascular:      Rate and Rhythm: Normal rate.   Pulmonary:      Effort: No respiratory distress.   Abdominal:      General: There is no distension.   Neurological:      Mental Status: She is alert and oriented to person, place, and time.   Psychiatric:         Mood and Affect: Mood normal.         Behavior: Behavior is cooperative.        Result Review :{ Labs  Result Review  Imaging  Med Tab  Media :          No results found for this or any previous visit.             CT Chest Without Contrast Diagnostic (05/09/2024 09:47)     My interpretation of imaging: Areas of mucous plugging and tree-in-bud infiltrate, stable.  Resolution of large right upper lobe anterior peripheral nodule.  Stable right middle lobe nodule.  New 6 mm right middle lobe nodule.  Bronchiectasis.      Assessment and Plan {CC Problem List  Visit Diagnosis  ROS  Review (Popup)  Health Maintenance  Quality  BestPractice  Medications  SmartSets  SnapShot Encounters  Media      Diagnoses and all orders for this visit:    1. Bronchiectasis without acute exacerbation (Primary)  -     OSCILLATING POSITIVE EXIRATORY PRESSURE (OPEP); Future    2. Nodule of middle lobe of right lung  -     CT Chest Without Contrast; Future    3. Nodule of upper lobe of right lung        BMI is within normal parameters. No other follow-up for BMI required.      We reviewed CT chest showing almost complete resolution of right upper lobe large nodule. "  New 6 mm right middle lobe nodule will need CT follow-up in 6 months.  Orders placed.  Bronchiectasis education provided.  Continue postural drainage.  Add Mucinex if needed.  Add flutter device.  Office follow-up in 6 months to review CT.  Call sooner if needed.    Mitzi Bell, APRN  5/15/2024  12:00 CDT    Follow Up {Instructions Charge Capture  Follow-up Communications   Return in about 6 months (around 11/15/2024) for Review CT with doc k .    Patient was given instructions and counseling regarding her condition or for health maintenance advice. Please see specific information pulled into the AVS if appropriate.

## 2024-05-15 ENCOUNTER — OFFICE VISIT (OUTPATIENT)
Dept: PULMONOLOGY | Facility: CLINIC | Age: 74
End: 2024-05-15
Payer: MEDICARE

## 2024-05-15 VITALS
SYSTOLIC BLOOD PRESSURE: 122 MMHG | WEIGHT: 127 LBS | DIASTOLIC BLOOD PRESSURE: 80 MMHG | HEART RATE: 67 BPM | HEIGHT: 68 IN | OXYGEN SATURATION: 98 % | BODY MASS INDEX: 19.25 KG/M2

## 2024-05-15 DIAGNOSIS — J47.9 BRONCHIECTASIS WITHOUT ACUTE EXACERBATION: Primary | Chronic | ICD-10-CM

## 2024-05-15 DIAGNOSIS — R91.1 NODULE OF UPPER LOBE OF RIGHT LUNG: ICD-10-CM

## 2024-05-15 DIAGNOSIS — R91.1 NODULE OF MIDDLE LOBE OF RIGHT LUNG: Chronic | ICD-10-CM

## 2024-05-15 PROCEDURE — 99214 OFFICE O/P EST MOD 30 MIN: CPT | Performed by: NURSE PRACTITIONER

## 2024-05-15 RX ORDER — UBIDECARENONE 100 MG
100 CAPSULE ORAL DAILY
COMMUNITY

## 2024-08-12 ENCOUNTER — TRANSCRIBE ORDERS (OUTPATIENT)
Dept: ADMINISTRATIVE | Facility: HOSPITAL | Age: 74
End: 2024-08-12
Payer: MEDICARE

## 2024-08-12 ENCOUNTER — LAB (OUTPATIENT)
Dept: LAB | Facility: HOSPITAL | Age: 74
End: 2024-08-12
Payer: MEDICARE

## 2024-08-12 DIAGNOSIS — R53.83 OTHER FATIGUE: Primary | ICD-10-CM

## 2024-08-12 DIAGNOSIS — R73.03 PREDIABETES: ICD-10-CM

## 2024-08-12 DIAGNOSIS — R53.83 OTHER FATIGUE: ICD-10-CM

## 2024-08-12 LAB
ALBUMIN SERPL-MCNC: 4.1 G/DL (ref 3.5–5.2)
ALBUMIN/GLOB SERPL: 1.3 G/DL
ALP SERPL-CCNC: 70 U/L (ref 39–117)
ALT SERPL W P-5'-P-CCNC: 19 U/L (ref 1–33)
ANION GAP SERPL CALCULATED.3IONS-SCNC: 9 MMOL/L (ref 5–15)
AST SERPL-CCNC: 18 U/L (ref 1–32)
BASOPHILS # BLD AUTO: 0.06 10*3/MM3 (ref 0–0.2)
BASOPHILS NFR BLD AUTO: 0.8 % (ref 0–1.5)
BILIRUB SERPL-MCNC: 0.2 MG/DL (ref 0–1.2)
BUN SERPL-MCNC: 24 MG/DL (ref 8–23)
BUN/CREAT SERPL: 34.3 (ref 7–25)
CALCIUM SPEC-SCNC: 9.3 MG/DL (ref 8.6–10.5)
CHLORIDE SERPL-SCNC: 103 MMOL/L (ref 98–107)
CO2 SERPL-SCNC: 28 MMOL/L (ref 22–29)
CREAT SERPL-MCNC: 0.7 MG/DL (ref 0.57–1)
DEPRECATED RDW RBC AUTO: 44.8 FL (ref 37–54)
EGFRCR SERPLBLD CKD-EPI 2021: 90.3 ML/MIN/1.73
EOSINOPHIL # BLD AUTO: 0.21 10*3/MM3 (ref 0–0.4)
EOSINOPHIL NFR BLD AUTO: 3 % (ref 0.3–6.2)
ERYTHROCYTE [DISTWIDTH] IN BLOOD BY AUTOMATED COUNT: 13.4 % (ref 12.3–15.4)
GLOBULIN UR ELPH-MCNC: 3.1 GM/DL
GLUCOSE SERPL-MCNC: 103 MG/DL (ref 65–99)
HCT VFR BLD AUTO: 43.2 % (ref 34–46.6)
HGB BLD-MCNC: 13.9 G/DL (ref 12–15.9)
IMM GRANULOCYTES # BLD AUTO: 0.01 10*3/MM3 (ref 0–0.05)
IMM GRANULOCYTES NFR BLD AUTO: 0.1 % (ref 0–0.5)
LYMPHOCYTES # BLD AUTO: 2.03 10*3/MM3 (ref 0.7–3.1)
LYMPHOCYTES NFR BLD AUTO: 28.6 % (ref 19.6–45.3)
MAGNESIUM SERPL-MCNC: 2.2 MG/DL (ref 1.6–2.4)
MCH RBC QN AUTO: 29.2 PG (ref 26.6–33)
MCHC RBC AUTO-ENTMCNC: 32.2 G/DL (ref 31.5–35.7)
MCV RBC AUTO: 90.8 FL (ref 79–97)
MONOCYTES # BLD AUTO: 0.4 10*3/MM3 (ref 0.1–0.9)
MONOCYTES NFR BLD AUTO: 5.6 % (ref 5–12)
NEUTROPHILS NFR BLD AUTO: 4.39 10*3/MM3 (ref 1.7–7)
NEUTROPHILS NFR BLD AUTO: 61.9 % (ref 42.7–76)
NRBC BLD AUTO-RTO: 0 /100 WBC (ref 0–0.2)
PLATELET # BLD AUTO: 235 10*3/MM3 (ref 140–450)
PMV BLD AUTO: 9.6 FL (ref 6–12)
POTASSIUM SERPL-SCNC: 4.3 MMOL/L (ref 3.5–5.2)
PROT SERPL-MCNC: 7.2 G/DL (ref 6–8.5)
RBC # BLD AUTO: 4.76 10*6/MM3 (ref 3.77–5.28)
SODIUM SERPL-SCNC: 140 MMOL/L (ref 136–145)
WBC NRBC COR # BLD AUTO: 7.1 10*3/MM3 (ref 3.4–10.8)

## 2024-08-12 PROCEDURE — 85025 COMPLETE CBC W/AUTO DIFF WBC: CPT

## 2024-08-12 PROCEDURE — 36415 COLL VENOUS BLD VENIPUNCTURE: CPT

## 2024-08-12 PROCEDURE — 84439 ASSAY OF FREE THYROXINE: CPT

## 2024-08-12 PROCEDURE — 82306 VITAMIN D 25 HYDROXY: CPT

## 2024-08-12 PROCEDURE — 80053 COMPREHEN METABOLIC PANEL: CPT

## 2024-08-12 PROCEDURE — 84443 ASSAY THYROID STIM HORMONE: CPT

## 2024-08-12 PROCEDURE — 83735 ASSAY OF MAGNESIUM: CPT

## 2024-08-12 PROCEDURE — 82607 VITAMIN B-12: CPT

## 2024-08-13 LAB
25(OH)D3 SERPL-MCNC: 41.5 NG/ML (ref 30–100)
T4 FREE SERPL-MCNC: 1.26 NG/DL (ref 0.92–1.68)
TSH SERPL DL<=0.05 MIU/L-ACNC: 1.13 UIU/ML (ref 0.27–4.2)
VIT B12 BLD-MCNC: 1113 PG/ML (ref 211–946)

## 2024-10-26 ENCOUNTER — TRANSCRIBE ORDERS (OUTPATIENT)
Dept: ADMINISTRATIVE | Facility: HOSPITAL | Age: 74
End: 2024-10-26
Payer: MEDICARE

## 2024-10-26 DIAGNOSIS — R73.03 PREDIABETES: Primary | ICD-10-CM

## 2024-11-12 ENCOUNTER — HOSPITAL ENCOUNTER (OUTPATIENT)
Dept: CT IMAGING | Facility: HOSPITAL | Age: 74
Discharge: HOME | End: 2024-11-12
Admitting: NURSE PRACTITIONER
Payer: MEDICARE

## 2024-11-12 ENCOUNTER — TELEPHONE (OUTPATIENT)
Dept: PULMONOLOGY | Facility: CLINIC | Age: 74
End: 2024-11-12
Payer: MEDICARE

## 2024-11-12 DIAGNOSIS — R91.1 NODULE OF MIDDLE LOBE OF RIGHT LUNG: Chronic | ICD-10-CM

## 2024-11-12 PROCEDURE — 71250 CT THORAX DX C-: CPT

## 2024-11-12 NOTE — TELEPHONE ENCOUNTER
----- Message from Mitzi Bell sent at 11/12/2024  2:32 PM CST -----  Let her know ct chest is stable. Nodule in right middle lobe almost completely resolved. Other nodular areas stable. Dr. Bridges will review with her at upcoming visit next week.

## 2024-11-16 PROBLEM — R91.1 NODULE OF MIDDLE LOBE OF RIGHT LUNG: Status: ACTIVE | Noted: 2024-11-16

## 2024-11-16 PROBLEM — J47.9 BRONCHIECTASIS WITHOUT ACUTE EXACERBATION: Status: ACTIVE | Noted: 2024-11-16

## 2024-11-16 PROBLEM — R91.1 NODULE OF UPPER LOBE OF RIGHT LUNG: Status: ACTIVE | Noted: 2024-11-16

## 2024-11-16 PROBLEM — Z87.891 HISTORY OF CIGARETTE SMOKING: Status: ACTIVE | Noted: 2024-11-16

## 2024-11-16 NOTE — PROGRESS NOTES
"Background:  Pt w rul nodule x3 identified 11/2023, up to 7 mm, quit smoking around 1995   Chief Complaint  Nodule of upper lobe of right lung    Subjective    History of Present Illness     Ngoc Phillip is here for follow up with [facility] PULMON[facility]IT[facility] CARE MEDIC[facility].  History of Present Illness  She smoked rarely and quit a long time ago.  She follows up to review CT findings.  These are discussed below.  She denies fevers or hemoptysis or other acute complaints.     Tobacco Use: Medium Risk (11/18/2024)    Patient History     Smoking Tobacco Use: Former     Smokeless Tobacco Use: Never     Passive Exposure: Past      Current Outpatient Medications   Medication Instructions    alendronate (FOSAMAX) 70 mg    amLODIPine (NORVASC) 2.5 MG tablet No dose, route, or frequency recorded.    benazepril (LOTENSIN) 10 MG tablet No dose, route, or frequency recorded.    Calcium Carb-Cholecalciferol (CALTRATE 600+D) 600-800 MG-UNIT tablet 1 tablet, 2 Times Daily With Meals    Chromium 1 MG capsule Take  by mouth.    coenzyme Q10 100 mg, Daily    Multiple Vitamins-Minerals (EYE VITAMINS PO) Take  by mouth.    Multiple Vitamins-Minerals (MULTIVITAMIN ADULTS 50+ PO) Take  by mouth.    Probiotic Product (PROBIOTIC-10 PO) Take  by mouth.      Objective     Vital Signs:   /78   Pulse 73   Ht 172.7 cm (68\")   Wt 56.2 kg (123 lb 12.8 oz)   SpO2 96% Comment: RA  BMI 18.82 kg/m²   Physical Exam  Constitutional:       General: She is not in acute distress.     Appearance: She is well-developed. She is not ill-appearing or toxic-appearing.   HENT:      Head: Atraumatic.   Eyes:      General: No scleral icterus.     Conjunctiva/sclera: Conjunctivae normal.   Cardiovascular:      Rate and Rhythm: Normal rate and regular rhythm.      Heart sounds: S1 normal and S2 normal.   Pulmonary:      Effort: Pulmonary effort is normal.      Breath sounds: Normal breath sounds.   Abdominal:      General: There is " no distension.   Musculoskeletal:         General: No deformity.      Cervical back: Neck supple.   Skin:     Findings: No rash.   Neurological:      Mental Status: She is alert.        Result Review  Data Reviewed:    CT Chest Without Contrast Diagnostic    Result Date: 11/12/2024  Impression: 1. A branching 8 x 5 mm right middle lobe nodule or mucous plug is smaller compared to 5/9/2024 where it measured 9 x 5 mm. 2. There has been complete or near-complete resolution of another 7 mm nodule described in the peripheral right middle lobe on the prior exam. 3. Bronchiectasis and mucous plugging bilaterally. The bronchiectasis is more severe in the right middle lobe and lingula. 4. Centrilobular emphysema. 5. Atheromatous calcification of the thoracic aorta. Mild coronary artery calcification. Heart size upper limits of normal. Ascending aorta mildly ectatic measuring 3.4 cm. 6. Small hiatal hernia. Prior cholecystectomy. 7. Consider a follow-up CT chest in 6-12 months.    This report was signed and finalized on 11/12/2024 1:59 PM by Dr. López Sandhu MD.     Personal review of imaging : CT scan shows nodular change in the right middle lobe, bronchiectasis with prominent airway dilation in the lingula               Assessment and Plan    Diagnoses and all orders for this visit:    1. Nodule of middle lobe of right lung (Primary)  -     CT Chest Without Contrast Diagnostic; Future    2. Nodule of upper lobe of right lung    3. Bronchiectasis without acute exacerbation  -     CT Chest Without Contrast Diagnostic; Future    4. History of cigarette smoking    We reviewed the findings on the ct, nothing looking like cancer but warrants follow-up and recheck  Continue mucus clearance maneuvers.   Avoid viral illnesses as best as possible  Follow up ct in 6 months  Follow up with PFT in 6 months for bronchiectasis    Follow Up   Return in about 6 months (around 5/18/2025) for full PFT, review CT.  Patient was given  instructions and counseling regarding her condition or for health maintenance advice. Please see specific information pulled into the AVS if appropriate.    Electronically signed by Raf Bridges MD, 11/18/2024, 17:11 CST

## 2024-11-18 ENCOUNTER — OFFICE VISIT (OUTPATIENT)
Dept: PULMONOLOGY | Facility: CLINIC | Age: 74
End: 2024-11-18
Payer: MEDICARE

## 2024-11-18 VITALS
HEART RATE: 73 BPM | SYSTOLIC BLOOD PRESSURE: 110 MMHG | OXYGEN SATURATION: 96 % | WEIGHT: 123.8 LBS | HEIGHT: 68 IN | DIASTOLIC BLOOD PRESSURE: 78 MMHG | BODY MASS INDEX: 18.76 KG/M2

## 2024-11-18 DIAGNOSIS — Z87.891 HISTORY OF CIGARETTE SMOKING: ICD-10-CM

## 2024-11-18 DIAGNOSIS — R91.1 NODULE OF MIDDLE LOBE OF RIGHT LUNG: Primary | ICD-10-CM

## 2024-11-18 DIAGNOSIS — R91.1 NODULE OF UPPER LOBE OF RIGHT LUNG: ICD-10-CM

## 2024-11-18 DIAGNOSIS — J47.9 BRONCHIECTASIS WITHOUT ACUTE EXACERBATION: ICD-10-CM

## 2024-11-18 PROCEDURE — 99214 OFFICE O/P EST MOD 30 MIN: CPT | Performed by: INTERNAL MEDICINE

## 2024-11-18 PROCEDURE — G2211 COMPLEX E/M VISIT ADD ON: HCPCS | Performed by: INTERNAL MEDICINE

## 2024-12-16 ENCOUNTER — HOSPITAL ENCOUNTER (OUTPATIENT)
Dept: MAMMOGRAPHY | Facility: HOSPITAL | Age: 74
Discharge: HOME | End: 2024-12-16
Admitting: INTERNAL MEDICINE
Payer: MEDICARE

## 2024-12-16 PROCEDURE — 77063 BREAST TOMOSYNTHESIS BI: CPT

## 2024-12-16 PROCEDURE — 77067 SCR MAMMO BI INCL CAD: CPT

## 2025-05-08 ENCOUNTER — HOSPITAL ENCOUNTER (OUTPATIENT)
Dept: CT IMAGING | Facility: HOSPITAL | Age: 75
Discharge: HOME | End: 2025-05-08
Admitting: INTERNAL MEDICINE
Payer: MEDICARE

## 2025-05-08 DIAGNOSIS — J47.9 BRONCHIECTASIS WITHOUT ACUTE EXACERBATION: ICD-10-CM

## 2025-05-08 DIAGNOSIS — R91.1 NODULE OF MIDDLE LOBE OF RIGHT LUNG: ICD-10-CM

## 2025-05-08 PROCEDURE — 71250 CT THORAX DX C-: CPT

## 2025-05-15 ENCOUNTER — OFFICE VISIT (OUTPATIENT)
Dept: PULMONOLOGY | Facility: CLINIC | Age: 75
End: 2025-05-15
Payer: MEDICARE

## 2025-05-15 VITALS
SYSTOLIC BLOOD PRESSURE: 116 MMHG | HEART RATE: 76 BPM | OXYGEN SATURATION: 95 % | BODY MASS INDEX: 18.94 KG/M2 | WEIGHT: 125 LBS | DIASTOLIC BLOOD PRESSURE: 72 MMHG | HEIGHT: 68 IN

## 2025-05-15 DIAGNOSIS — J47.9 BRONCHIECTASIS WITHOUT ACUTE EXACERBATION: Primary | ICD-10-CM

## 2025-05-15 DIAGNOSIS — R91.1 NODULE OF LOWER LOBE OF RIGHT LUNG: ICD-10-CM

## 2025-05-15 NOTE — PROCEDURES
Spirometry with Diffusion Capacity & Lung Volumes    Performed by: Clementine Lima, RRT  Authorized by: Raf Bridges MD     Pre Drug % Predicted    FVC: 80%   FEV1: 48%   FEF 25-75%: 18%   FEV1/FVC: 46%   T%   RV: 131%   DLCO: 49%   D/VAsb: 68%    Interpretation   Spirometry   Spirometry shows severe obstruction.   Review of FVL curve   Patient's effort is normal.   Lung Volume Measurements  Measurements show elevated residual volume consistent with gas trapping.   Diffusion Capacity  The patient's diffusion capacity is moderately reduced.  Diffusion capacity is normal when corrected for alveolar volume.   Electronically signed by Raf Bridges MD, 5/15/2025, 14:42 CDT

## 2025-05-15 NOTE — PROGRESS NOTES
"Background:  Pt w rul nodule x3 identified 11/2023, up to 7 mm, quit smoking around 1995   Chief Complaint  Bronchiectasis without acute exacerbation    Subjective    History of Present Illness     Ngoc Phillip is here for follow up with [facility] PULMON[facility]IT[facility]ARE MEDIC[facility].  History of Present Illness  Pt follows up after new ct chest with hx lung nodules and bronchiectasis with mucus plugging.  No significant exacerbations or other acute complaints.     Tobacco Use: Medium Risk (5/15/2025)    Patient History     Smoking Tobacco Use: Former     Smokeless Tobacco Use: Never     Passive Exposure: Past      Current Outpatient Medications   Medication Instructions    alendronate (FOSAMAX) 70 mg    amLODIPine (NORVASC) 2.5 MG tablet No dose, route, or frequency recorded.    benazepril (LOTENSIN) 10 MG tablet No dose, route, or frequency recorded.    Calcium Carb-Cholecalciferol (CALTRATE 600+D) 600-800 MG-UNIT tablet 1 tablet, 2 Times Daily With Meals    Chromium 1 MG capsule Take  by mouth.    coenzyme Q10 100 mg, Daily    Fluticasone-Umeclidin-Vilant (TRELEGY ELLIPTA) 200-62.5-25 MCG/ACT inhaler 1 puff, Inhalation, Daily    Multiple Vitamins-Minerals (EYE VITAMINS PO) Take  by mouth.    Multiple Vitamins-Minerals (MULTIVITAMIN ADULTS 50+ PO) Take  by mouth.    Probiotic Product (PROBIOTIC-10 PO) Take  by mouth.      Objective     Vital Signs:   /72   Pulse 76   Ht 172.7 cm (68\")   Wt 56.7 kg (125 lb)   SpO2 95% Comment: RA  BMI 19.01 kg/m²   Physical Exam  Constitutional:       General: She is not in acute distress.     Appearance: She is well-developed. She is not ill-appearing or toxic-appearing.   HENT:      Head: Atraumatic.   Eyes:      General: No scleral icterus.     Conjunctiva/sclera: Conjunctivae normal.   Cardiovascular:      Rate and Rhythm: Normal rate and regular rhythm.      Heart sounds: S1 normal and S2 normal.   Pulmonary:      Effort: Pulmonary effort is " normal. No respiratory distress.      Breath sounds: Normal breath sounds. No wheezing or rhonchi.   Abdominal:      General: There is no distension.   Musculoskeletal:      Cervical back: Neck supple.   Neurological:      Mental Status: She is alert.        Result Review  Data Reviewed:    CT Chest Without Contrast Diagnostic  Result Date: 5/8/2025  Impression: 1. Chronic lung changes with bronchiectasis and scattered calcified granulomas. 2. New RIGHT lung nodules. 1 of these measures 11 x 8 mm. 3. 3-month noncontrast chest CT follow-up is recommended.  This report was signed and finalized on 5/8/2025 1:05 PM by Dr. Chava Waters MD.      Personal review of imaging : CT scan shows multiple nodular densities.  Largest 11 mm as also noted per radiologist.  Suspect this is endobronchial mucous in the setting of bronchiectasis  PFT Values          5/15/2025    11:15   Pre Drug PFT Results   FVC 80   FEV1 48   FEF 25-75% 18   FEV1/FVC 46   Other Tests PFT Results         DLCO 49   D/VAsb 68                Assessment and Plan    Diagnoses and all orders for this visit:    1. Bronchiectasis without acute exacerbation (Primary)  -     Spirometry with Diffusion Capacity & Lung Volumes  -     CT Chest Without Contrast Diagnostic; Future  -     Fluticasone-Umeclidin-Vilant (TRELEGY ELLIPTA) 200-62.5-25 MCG/ACT inhaler; Inhale 1 puff Daily for 14 days.  Dispense: 14 each; Refill: 0    2. Nodule of lower lobe of right lung  -     CT Chest Without Contrast Diagnostic; Future    We reviewed her CT scan in detail today.  Likely benign but warrants follow-up.  Will plan to repeat CT in 3 months.  We also discussed her PFT result showing significant obstruction and some gas trapping.  Continue monitoring for infectious symptoms.  Trial of Trelegy inhaler for the next couple of weeks for intermittent dyspnea and mucus production.  She will call to let us know how that does, prescription if helpful.  She might do well  with a LAMA LABA preparation.  We also discussed novel therapy for bronchiectasis may be available later in the summer.  Review CT next.    Follow Up   Return in about 3 months (around 8/15/2025).  Patient was given instructions and counseling regarding her condition or for health maintenance advice. Please see specific information pulled into the AVS if appropriate.    Electronically signed by Raf Bridges MD, 5/15/2025, 17:11 CDT

## 2025-05-29 ENCOUNTER — TELEPHONE (OUTPATIENT)
Dept: PULMONOLOGY | Facility: CLINIC | Age: 75
End: 2025-05-29
Payer: MEDICARE

## 2025-05-29 DIAGNOSIS — J47.9 BRONCHIECTASIS WITHOUT ACUTE EXACERBATION: Primary | ICD-10-CM

## 2025-05-29 NOTE — TELEPHONE ENCOUNTER
Patient called stating that since she has taken the Trelegy she has coughed less and has less shortness of breath.  She was confused though since she has bronchiectasis she thought she was supposed to cough up the mucus?  Please advise.    Patient is aware that Dr. Bridges is in clinic at this time, and that it will be tomorrow when I reach back out to her.

## 2025-05-29 NOTE — TELEPHONE ENCOUNTER
She is still going to have periods of time when she needs to cough out mucus, but some of her problem could be inflammation and bronchospasm, which the inhaler is fixing. Continue taking it since she is better.

## 2025-05-30 RX ORDER — BUDESONIDE AND FORMOTEROL FUMARATE DIHYDRATE 160; 4.5 UG/1; UG/1
2 AEROSOL RESPIRATORY (INHALATION) 2 TIMES DAILY
Qty: 10.2 G | Refills: 11 | Status: SHIPPED | OUTPATIENT
Start: 2025-05-30

## 2025-05-30 NOTE — TELEPHONE ENCOUNTER
She needs a prescription for one sent since you want her to keep taking it, she did state that you mentioned maybe a cheaper one than Trelegy was going to be sent to the pharmacy.

## 2025-08-05 ENCOUNTER — TELEPHONE (OUTPATIENT)
Dept: PULMONOLOGY | Facility: CLINIC | Age: 75
End: 2025-08-05
Payer: MEDICARE

## 2025-08-07 ENCOUNTER — HOSPITAL ENCOUNTER (OUTPATIENT)
Dept: CT IMAGING | Facility: HOSPITAL | Age: 75
Discharge: HOME | End: 2025-08-07
Admitting: INTERNAL MEDICINE
Payer: MEDICARE

## 2025-08-07 DIAGNOSIS — J47.9 BRONCHIECTASIS WITHOUT ACUTE EXACERBATION: ICD-10-CM

## 2025-08-07 DIAGNOSIS — R91.1 NODULE OF LOWER LOBE OF RIGHT LUNG: ICD-10-CM

## 2025-08-07 PROCEDURE — 71250 CT THORAX DX C-: CPT

## 2025-08-15 ENCOUNTER — OFFICE VISIT (OUTPATIENT)
Dept: PULMONOLOGY | Facility: CLINIC | Age: 75
End: 2025-08-15
Payer: MEDICARE

## 2025-08-15 ENCOUNTER — TELEPHONE (OUTPATIENT)
Dept: PULMONOLOGY | Facility: CLINIC | Age: 75
End: 2025-08-15

## 2025-08-15 VITALS
DIASTOLIC BLOOD PRESSURE: 68 MMHG | OXYGEN SATURATION: 97 % | HEIGHT: 68 IN | BODY MASS INDEX: 19 KG/M2 | WEIGHT: 125.4 LBS | SYSTOLIC BLOOD PRESSURE: 120 MMHG | HEART RATE: 70 BPM

## 2025-08-15 DIAGNOSIS — J47.9 BRONCHIECTASIS WITHOUT ACUTE EXACERBATION: Primary | Chronic | ICD-10-CM

## 2025-08-15 DIAGNOSIS — J47.9 BRONCHIECTASIS WITHOUT ACUTE EXACERBATION: Primary | ICD-10-CM

## 2025-08-15 DIAGNOSIS — Z87.891 HISTORY OF CIGARETTE SMOKING: Chronic | ICD-10-CM

## 2025-08-15 DIAGNOSIS — R91.8 LUNG NODULES: Chronic | ICD-10-CM

## 2025-08-15 PROCEDURE — 99214 OFFICE O/P EST MOD 30 MIN: CPT | Performed by: NURSE PRACTITIONER

## 2025-08-15 RX ORDER — TIOTROPIUM BROMIDE 18 UG/1
1 CAPSULE ORAL; RESPIRATORY (INHALATION) DAILY
Qty: 30 CAPSULE | Refills: 11 | Status: SHIPPED | OUTPATIENT
Start: 2025-08-15 | End: 2025-08-15

## 2025-08-18 ENCOUNTER — RESULTS FOLLOW-UP (OUTPATIENT)
Dept: PULMONOLOGY | Facility: CLINIC | Age: 75
End: 2025-08-18
Payer: MEDICARE

## 2025-08-18 ENCOUNTER — LAB (OUTPATIENT)
Dept: LAB | Facility: HOSPITAL | Age: 75
End: 2025-08-18
Payer: MEDICARE

## 2025-08-18 DIAGNOSIS — J47.9 BRONCHIECTASIS WITHOUT ACUTE EXACERBATION: Chronic | ICD-10-CM

## 2025-08-18 PROCEDURE — 87070 CULTURE OTHR SPECIMN AEROBIC: CPT

## 2025-08-18 PROCEDURE — 87102 FUNGUS ISOLATION CULTURE: CPT

## 2025-08-18 PROCEDURE — 87116 MYCOBACTERIA CULTURE: CPT

## 2025-08-18 PROCEDURE — 87205 SMEAR GRAM STAIN: CPT

## 2025-08-18 PROCEDURE — 87206 SMEAR FLUORESCENT/ACID STAI: CPT

## 2025-08-20 LAB
BACTERIA SPEC RESP CULT: NORMAL
GRAM STN SPEC: NORMAL

## 2025-08-27 ENCOUNTER — TELEPHONE (OUTPATIENT)
Dept: PULMONOLOGY | Facility: CLINIC | Age: 75
End: 2025-08-27
Payer: MEDICARE

## 2025-08-27 LAB
Lab: ABNORMAL
Lab: ABNORMAL
MYCOBACTERIUM SPEC CULT: ABNORMAL
NIGHT BLUE STAIN TISS: ABNORMAL
NIGHT BLUE STAIN TISS: ABNORMAL

## 2025-08-28 ENCOUNTER — LAB (OUTPATIENT)
Dept: LAB | Facility: HOSPITAL | Age: 75
End: 2025-08-28
Payer: MEDICARE

## 2025-08-28 DIAGNOSIS — J47.9 BRONCHIECTASIS WITHOUT ACUTE EXACERBATION: ICD-10-CM

## 2025-08-28 PROCEDURE — 86480 TB TEST CELL IMMUN MEASURE: CPT

## 2025-08-28 PROCEDURE — 36415 COLL VENOUS BLD VENIPUNCTURE: CPT

## 2025-08-30 LAB
GAMMA INTERFERON BACKGROUND BLD IA-ACNC: 0.06 IU/ML
M TB IFN-G BLD-IMP: NEGATIVE
M TB IFN-G CD4+ BCKGRND COR BLD-ACNC: 0.14 IU/ML
M TB IFN-G CD4+CD8+ BCKGRND COR BLD-ACNC: 0.15 IU/ML
MITOGEN IGNF BCKGRD COR BLD-ACNC: >10 IU/ML
SERVICE CMNT-IMP: NORMAL

## 2025-08-31 ENCOUNTER — RESULTS FOLLOW-UP (OUTPATIENT)
Dept: LAB | Facility: HOSPITAL | Age: 75
End: 2025-08-31
Payer: MEDICARE

## (undated) DEVICE — CUFF,BP,DISP,1 TUBE,ADULT,HP: Brand: MEDLINE

## (undated) DEVICE — YANKAUER,BULB TIP WITH VENT: Brand: ARGYLE

## (undated) DEVICE — TBG SMPL FLTR LINE NASL 02/C02 A/ BX/100

## (undated) DEVICE — THE CHANNEL CLEANING BRUSH IS A NYLON FLEXI BRUSH ATTACHED TO A FLEXIBLE PLASTIC SHEATH DESIGNED TO SAFELY REMOVE DEBRIS FROM FLEXIBLE ENDOSCOPES.

## (undated) DEVICE — CONMED SCOPE SAVER BITE BLOCK, 20X27 MM: Brand: SCOPE SAVER

## (undated) DEVICE — Device: Brand: DEFENDO AIR/WATER/SUCTION AND BIOPSY VALVE

## (undated) DEVICE — MASK,OXYGEN,MED CONC,ADLT,7' TUB, UC: Brand: PENDING

## (undated) DEVICE — SENSR O2 OXIMAX FNGR A/ 18IN NONSTR